# Patient Record
Sex: FEMALE | Race: BLACK OR AFRICAN AMERICAN | URBAN - NONMETROPOLITAN AREA
[De-identification: names, ages, dates, MRNs, and addresses within clinical notes are randomized per-mention and may not be internally consistent; named-entity substitution may affect disease eponyms.]

---

## 2023-02-08 ENCOUNTER — TELEPHONE ENCOUNTER (OUTPATIENT)
Dept: URBAN - NONMETROPOLITAN AREA CLINIC 2 | Facility: CLINIC | Age: 48
End: 2023-02-08

## 2023-02-13 ENCOUNTER — OFFICE VISIT (OUTPATIENT)
Dept: URBAN - NONMETROPOLITAN AREA CLINIC 2 | Facility: CLINIC | Age: 48
End: 2023-02-13

## 2023-02-14 ENCOUNTER — LAB OUTSIDE AN ENCOUNTER (OUTPATIENT)
Dept: URBAN - NONMETROPOLITAN AREA CLINIC 2 | Facility: CLINIC | Age: 48
End: 2023-02-14

## 2023-02-14 ENCOUNTER — OFFICE VISIT (OUTPATIENT)
Dept: URBAN - NONMETROPOLITAN AREA CLINIC 2 | Facility: CLINIC | Age: 48
End: 2023-02-14
Payer: COMMERCIAL

## 2023-02-14 ENCOUNTER — WEB ENCOUNTER (OUTPATIENT)
Dept: URBAN - NONMETROPOLITAN AREA CLINIC 2 | Facility: CLINIC | Age: 48
End: 2023-02-14

## 2023-02-14 VITALS
HEART RATE: 96 BPM | WEIGHT: 129 LBS | BODY MASS INDEX: 20.25 KG/M2 | DIASTOLIC BLOOD PRESSURE: 80 MMHG | HEIGHT: 67 IN | TEMPERATURE: 98.5 F | SYSTOLIC BLOOD PRESSURE: 131 MMHG

## 2023-02-14 DIAGNOSIS — R63.0 LACK OF APPETITE: ICD-10-CM

## 2023-02-14 DIAGNOSIS — R63.4 WEIGHT LOSS: ICD-10-CM

## 2023-02-14 DIAGNOSIS — M06.9 RHEUMATOID ARTHRITIS, INVOLVING UNSPECIFIED SITE, UNSPECIFIED WHETHER RHEUMATOID FACTOR PRESENT: ICD-10-CM

## 2023-02-14 DIAGNOSIS — Z12.11 COLON CANCER SCREENING: ICD-10-CM

## 2023-02-14 PROBLEM — 69896004: Status: ACTIVE | Noted: 2023-02-14

## 2023-02-14 PROCEDURE — 99204 OFFICE O/P NEW MOD 45 MIN: CPT | Performed by: INTERNAL MEDICINE

## 2023-02-14 PROCEDURE — 99244 OFF/OP CNSLTJ NEW/EST MOD 40: CPT | Performed by: INTERNAL MEDICINE

## 2023-02-14 RX ORDER — GABAPENTIN 300 MG/1
1 CAPSULE CAPSULE ORAL ONCE A DAY
Status: ACTIVE | COMMUNITY

## 2023-02-14 RX ORDER — OMEPRAZOLE 20 MG/1
1 CAPSULE 30 MINUTES BEFORE MORNING MEAL CAPSULE, DELAYED RELEASE ORAL ONCE A DAY
Qty: 90 CAPSULES | Refills: 3 | OUTPATIENT
Start: 2023-02-14

## 2023-02-14 NOTE — HPI-TODAY'S VISIT:
Today/14/2023 Mrs. Cunha is a 47-year-old female who is referred to our clinic by Dr. Dylon Figueroa for consultation of elevated liver enzymes and weight loss.  A copy this note be sent to the referring physician.  She states over the past 2 years she has lost over 100 pounds.  She reports early satiety and lack of appetite.  This started after her initial COVID infection.  Her liver enzymes are slowly trending upward AST triple ALT with elevated alkaline phosphatase.  She drinks 3-4 angry orchard ciders 4 to 5 days out of the week.  She is on ibuprofen every other day for her rheumatoid arthritis along with Percocet 7.5.  On her CT imaging done with contrast in September 2022 she has significant moderate to severe steatosis.  Her synthetic function including her platelets and her bilirubin are normal.  She has not had any chronic serologies for her liver that she is aware of.  She is never had a colonoscopy.  She denies any reflux.  She reports nausea and early satiety.  Today we have discussed her differential.  I suspect that her nausea and early satiety along with weight loss may be due to narcotic plus or minus post viral gastroparesis.  We will start omeprazole 20 mg daily and schedule gastric emptying study.  Consider EGD at follow-up visit.  Her liver enzymes are elevated in an alcoholic pattern however she does have rheumatoid arthritis and is on NSAIDs along with narcotics.  We will check chronic serologies and repeat imaging with FibroScan.  I want her to stop all alcohol.  We will follow-up pending her work-up.  MB

## 2023-02-19 LAB
ABSOLUTE BASOPHILS: 49
ABSOLUTE EOSINOPHILS: 42
ABSOLUTE LYMPHOCYTES: 2975
ABSOLUTE MONOCYTES: 518
ABSOLUTE NEUTROPHILS: 3416
ACTIN (SMOOTH MUSCLE) ANTIBODY (IGG): <20
ALPHA-1-ANTITRYPSIN QN: 197
ANA SCREEN, IFA: NEGATIVE
BASOPHILS: 0.7
CERULOPLASMIN: 34
EOSINOPHILS: 0.6
FERRITIN, SERUM: 41
GGT: 199
HBSAG SCREEN: (no result)
HEMATOCRIT: 39.8
HEMOGLOBIN: 13
HEP A AB, IGM: (no result)
HEP B CORE AB, IGM: (no result)
HEP C VIRUS AB: 0.02
HEPATITIS C ANTIBODY: (no result)
IMMUNOGLOBULIN G, QN, SERUM: 2127
INR: 1
IRON BIND.CAP.(TIBC): 354
IRON SATURATION: 35
IRON: 123
LKM-1 ANTIBODY (IGG): <=20
LYMPHOCYTES: 42.5
MCH: 29.4
MCHC: 32.7
MCV: 90
MITOCHONDRIAL (M2) ANTIBODY: <=20
MONOCYTES: 7.4
MPV: 10.9
NEUTROPHILS: 48.8
PLATELET COUNT: 224
PT: 10.7
RDW: 13.6
RED BLOOD CELL COUNT: 4.42
TSH: 0.35
WHITE BLOOD CELL COUNT: 7

## 2023-02-22 ENCOUNTER — TELEPHONE ENCOUNTER (OUTPATIENT)
Dept: URBAN - METROPOLITAN AREA CLINIC 92 | Facility: CLINIC | Age: 48
End: 2023-02-22

## 2023-03-16 ENCOUNTER — TELEPHONE ENCOUNTER (OUTPATIENT)
Dept: URBAN - METROPOLITAN AREA CLINIC 35 | Facility: CLINIC | Age: 48
End: 2023-03-16

## 2023-03-16 LAB
A/G RATIO: 1.1
ALBUMIN: 3.9
ALKALINE PHOSPHATASE: 139
ALT (SGPT): 19
AST (SGOT): 52
BILIRUBIN, TOTAL: 0.4
BUN/CREATININE RATIO: (no result)
BUN: 7
CALCIUM: 9.2
CARBON DIOXIDE, TOTAL: 30
CHLORIDE: 103
CREATININE: 0.69
EGFR: 109
GLOBULIN, TOTAL: 3.5
GLUCOSE: 101
POTASSIUM: 4.4
PROTEIN, TOTAL: 7.4
SODIUM: 139

## 2023-03-27 NOTE — PHYSICAL EXAM SKIN:
no rashes , no suspicious lesions Valtrex Pregnancy And Lactation Text: this medication is Pregnancy Category B and is considered safe during pregnancy. This medication is not directly found in breast milk but it's metabolite acyclovir is present.

## 2023-04-10 ENCOUNTER — TELEPHONE ENCOUNTER (OUTPATIENT)
Dept: URBAN - METROPOLITAN AREA CLINIC 35 | Facility: CLINIC | Age: 48
End: 2023-04-10

## 2023-05-10 ENCOUNTER — TELEPHONE ENCOUNTER (OUTPATIENT)
Dept: URBAN - NONMETROPOLITAN AREA CLINIC 2 | Facility: CLINIC | Age: 48
End: 2023-05-10

## 2023-05-10 RX ORDER — POLYETHYLENE GLYCOL 3350, SODIUM SULFATE ANHYDROUS, SODIUM BICARBONATE, SODIUM CHLORIDE, POTASSIUM CHLORIDE 236; 22.74; 6.74; 5.86; 2.97 G/4L; G/4L; G/4L; G/4L; G/4L
3000 ML POWDER, FOR SOLUTION ORAL ONCE
Qty: 1 UNSPECIFIED | Refills: 0 | OUTPATIENT
Start: 2023-05-10 | End: 2023-05-11

## 2023-05-12 ENCOUNTER — OFFICE VISIT (OUTPATIENT)
Dept: URBAN - NONMETROPOLITAN AREA SURGERY CENTER 1 | Facility: SURGERY CENTER | Age: 48
End: 2023-05-12
Payer: COMMERCIAL

## 2023-05-12 DIAGNOSIS — Z12.11 COLON CANCER SCREENING: ICD-10-CM

## 2023-05-12 PROCEDURE — G8907 PT DOC NO EVENTS ON DISCHARG: HCPCS | Performed by: INTERNAL MEDICINE

## 2023-05-12 PROCEDURE — 45378 DIAGNOSTIC COLONOSCOPY: CPT | Performed by: INTERNAL MEDICINE

## 2023-08-15 ENCOUNTER — OFFICE VISIT (OUTPATIENT)
Dept: URBAN - NONMETROPOLITAN AREA CLINIC 2 | Facility: CLINIC | Age: 48
End: 2023-08-15
Payer: COMMERCIAL

## 2023-08-15 VITALS
DIASTOLIC BLOOD PRESSURE: 74 MMHG | HEIGHT: 67 IN | SYSTOLIC BLOOD PRESSURE: 151 MMHG | WEIGHT: 135.8 LBS | TEMPERATURE: 98.7 F | HEART RATE: 116 BPM | BODY MASS INDEX: 21.31 KG/M2

## 2023-08-15 DIAGNOSIS — M06.9 RHEUMATOID ARTHRITIS, INVOLVING UNSPECIFIED SITE, UNSPECIFIED WHETHER RHEUMATOID FACTOR PRESENT: ICD-10-CM

## 2023-08-15 DIAGNOSIS — R63.4 WEIGHT LOSS: ICD-10-CM

## 2023-08-15 DIAGNOSIS — R63.0 LACK OF APPETITE: ICD-10-CM

## 2023-08-15 DIAGNOSIS — Z12.11 COLON CANCER SCREENING: ICD-10-CM

## 2023-08-15 DIAGNOSIS — R79.89 LOW TSH LEVEL: ICD-10-CM

## 2023-08-15 DIAGNOSIS — R74.8 ELEVATED LIVER ENZYMES: ICD-10-CM

## 2023-08-15 PROBLEM — 131017004: Status: ACTIVE | Noted: 2023-02-22

## 2023-08-15 PROBLEM — 89362005: Status: ACTIVE | Noted: 2023-02-14

## 2023-08-15 PROBLEM — 79890006: Status: ACTIVE | Noted: 2023-02-14

## 2023-08-15 PROBLEM — 305058001: Status: ACTIVE | Noted: 2023-02-14

## 2023-08-15 PROBLEM — 707724006: Status: ACTIVE | Noted: 2023-02-14

## 2023-08-15 PROCEDURE — 99214 OFFICE O/P EST MOD 30 MIN: CPT | Performed by: NURSE PRACTITIONER

## 2023-08-15 RX ORDER — GABAPENTIN 600 MG/1
1 TABLET TABLET, FILM COATED ORAL ONCE A DAY
Status: ACTIVE | COMMUNITY

## 2023-08-15 RX ORDER — OMEPRAZOLE 20 MG/1
1 CAPSULE 30 MINUTES BEFORE MORNING MEAL CAPSULE, DELAYED RELEASE ORAL ONCE A DAY
Qty: 90 CAPSULES | Refills: 3 | Status: ACTIVE | COMMUNITY
Start: 2023-02-14

## 2023-08-15 RX ORDER — OMEPRAZOLE 20 MG/1
1 CAPSULE 30 MINUTES BEFORE MORNING MEAL CAPSULE, DELAYED RELEASE ORAL ONCE A DAY
Qty: 90 CAPSULES | Refills: 3
Start: 2023-02-14

## 2023-08-15 NOTE — HPI-TODAY'S VISIT:
Today/14/2023 Mrs. Cunha is a 47-year-old female who is referred to our clinic by Dr. Dylon Figueroa for consultation of elevated liver enzymes and weight loss.  A copy this note be sent to the referring physician.  She states over the past 2 years she has lost over 100 pounds.  She reports early satiety and lack of appetite.  This started after her initial COVID infection.  Her liver enzymes are slowly trending upward AST triple ALT with elevated alkaline phosphatase.  She drinks 3-4 angry orchard ciders 4 to 5 days out of the week.  She is on ibuprofen every other day for her rheumatoid arthritis along with Percocet 7.5.  On her CT imaging done with contrast in September 2022 she has significant moderate to severe steatosis.  Her synthetic function including her platelets and her bilirubin are normal.  She has not had any chronic serologies for her liver that she is aware of.  She is never had a colonoscopy.  She denies any reflux.  She reports nausea and early satiety.  Today we have discussed her differential.  I suspect that her nausea and early satiety along with weight loss may be due to narcotic plus or minus post viral gastroparesis.  We will start omeprazole 20 mg daily and schedule gastric emptying study.  Consider EGD at follow-up visit.  Her liver enzymes are elevated in an alcoholic pattern however she does have rheumatoid arthritis and is on NSAIDs along with narcotics.  We will check chronic serologies and repeat imaging with FibroScan.  I want her to stop all alcohol.  We will follow-up pending her work-up.  MB 8/15/2023 Janette presents for follow-up of elevated liver enzymes.  Since her last visit she is cut her drinking in half, she is having 1-2 angry orchard ciders 3 to 4 days a week.  She does take shots occasionally but has not done so in months.  She continues to take narcotics for her chronic pain and RA along with neuropathy but she has been transitioned over to methadone.  She has been off NSAIDs.  Her colonoscopy is normal.  Her gastric emptying study is normal however she does have 39% of the material at 59 minutes.  She is on omeprazole 20 mg daily.  Her appetite comes and goes.  She is being worked up at [place] for her neuropathy.  She is also following for disability.  Today she is due for repeat labs, we had a long discussion again regarding alcohol cessation.  Her TSH was very low, she still not seen endocrinology for this.  We will repeat her numbers today.  Want her to continue to work on alcohol cessation.  She has severe steatosis on CT imaging and FT4 on FibroScan.  Her synthetic function remains normal, we will repeat her labs today.  MB

## 2023-08-16 ENCOUNTER — TELEPHONE ENCOUNTER (OUTPATIENT)
Dept: URBAN - NONMETROPOLITAN AREA CLINIC 2 | Facility: CLINIC | Age: 48
End: 2023-08-16

## 2023-08-16 LAB
A/G RATIO: 1.3
ABSOLUTE BASOPHILS: 47
ABSOLUTE EOSINOPHILS: 103
ABSOLUTE LYMPHOCYTES: 3531
ABSOLUTE MONOCYTES: 545
ABSOLUTE NEUTROPHILS: 3674
ALBUMIN: 4.1
ALKALINE PHOSPHATASE: 144
ALT (SGPT): 14
AST (SGOT): 29
BASOPHILS: 0.6
BILIRUBIN, TOTAL: 0.3
BUN/CREATININE RATIO: 7
BUN: 6
CALCIUM: 8.9
CARBON DIOXIDE, TOTAL: 29
CHLORIDE: 105
CREATININE: 0.87
EGFR: 84
EOSINOPHILS: 1.3
GLOBULIN, TOTAL: 3.1
GLUCOSE: 81
HEMATOCRIT: 42.6
HEMOGLOBIN: 13.5
INR: 1
LYMPHOCYTES: 44.7
MCH: 31.4
MCHC: 31.7
MCV: 99.1
MONOCYTES: 6.9
MPV: 10.7
NEUTROPHILS: 46.5
PLATELET COUNT: 250
POTASSIUM: 3.7
PROTEIN, TOTAL: 7.2
PT: 10.9
RDW: 12
RED BLOOD CELL COUNT: 4.3
SODIUM: 143
TSH W/REFLEX TO FT4: 0.55
WHITE BLOOD CELL COUNT: 7.9

## 2023-11-15 ENCOUNTER — OFFICE VISIT (OUTPATIENT)
Dept: URBAN - METROPOLITAN AREA TELEHEALTH 2 | Facility: TELEHEALTH | Age: 48
End: 2023-11-15

## 2023-11-15 RX ORDER — GABAPENTIN 600 MG/1
1 TABLET TABLET, FILM COATED ORAL ONCE A DAY
Status: ACTIVE | COMMUNITY

## 2023-11-15 RX ORDER — OMEPRAZOLE 20 MG/1
1 CAPSULE 30 MINUTES BEFORE MORNING MEAL CAPSULE, DELAYED RELEASE ORAL ONCE A DAY
Qty: 90 CAPSULES | Refills: 3 | Status: ACTIVE | COMMUNITY
Start: 2023-02-14

## 2024-03-04 ENCOUNTER — LAB (OUTPATIENT)
Dept: URBAN - NONMETROPOLITAN AREA CLINIC 2 | Facility: CLINIC | Age: 49
End: 2024-03-04

## 2024-03-04 ENCOUNTER — OV EP (OUTPATIENT)
Dept: URBAN - NONMETROPOLITAN AREA CLINIC 2 | Facility: CLINIC | Age: 49
End: 2024-03-04
Payer: COMMERCIAL

## 2024-03-04 VITALS
HEART RATE: 82 BPM | SYSTOLIC BLOOD PRESSURE: 137 MMHG | TEMPERATURE: 98.8 F | HEIGHT: 67 IN | BODY MASS INDEX: 21.77 KG/M2 | DIASTOLIC BLOOD PRESSURE: 82 MMHG | WEIGHT: 138.7 LBS

## 2024-03-04 DIAGNOSIS — R63.4 WEIGHT LOSS: ICD-10-CM

## 2024-03-04 DIAGNOSIS — Z12.11 COLON CANCER SCREENING: ICD-10-CM

## 2024-03-04 DIAGNOSIS — M06.9 RHEUMATOID ARTHRITIS, INVOLVING UNSPECIFIED SITE, UNSPECIFIED WHETHER RHEUMATOID FACTOR PRESENT: ICD-10-CM

## 2024-03-04 DIAGNOSIS — R63.0 LACK OF APPETITE: ICD-10-CM

## 2024-03-04 DIAGNOSIS — R79.89 LOW TSH LEVEL: ICD-10-CM

## 2024-03-04 DIAGNOSIS — R74.8 ELEVATED LIVER ENZYMES: ICD-10-CM

## 2024-03-04 PROCEDURE — 99214 OFFICE O/P EST MOD 30 MIN: CPT | Performed by: NURSE PRACTITIONER

## 2024-03-04 RX ORDER — GABAPENTIN 600 MG/1
1 TABLET TABLET, FILM COATED ORAL ONCE A DAY
Status: ACTIVE | COMMUNITY

## 2024-03-04 RX ORDER — OMEPRAZOLE 20 MG/1
1 CAPSULE 30 MINUTES BEFORE MORNING MEAL CAPSULE, DELAYED RELEASE ORAL ONCE A DAY
Qty: 90 CAPSULES | Refills: 3 | Status: ACTIVE | COMMUNITY
Start: 2023-02-14

## 2024-03-04 NOTE — HPI-TODAY'S VISIT:
Today/14/2023 Mrs. Cunha is a 47-year-old female who is referred to our clinic by Dr. Dylon Figueroa for consultation of elevated liver enzymes and weight loss.  A copy this note be sent to the referring physician.  She states over the past 2 years she has lost over 100 pounds.  She reports early satiety and lack of appetite.  This started after her initial COVID infection.  Her liver enzymes are slowly trending upward AST triple ALT with elevated alkaline phosphatase.  She drinks 3-4 angry orchard ciders 4 to 5 days out of the week.  She is on ibuprofen every other day for her rheumatoid arthritis along with Percocet 7.5.  On her CT imaging done with contrast in September 2022 she has significant moderate to severe steatosis.  Her synthetic function including her platelets and her bilirubin are normal.  She has not had any chronic serologies for her liver that she is aware of.  She is never had a colonoscopy.  She denies any reflux.  She reports nausea and early satiety.  Today we have discussed her differential.  I suspect that her nausea and early satiety along with weight loss may be due to narcotic plus or minus post viral gastroparesis.  We will start omeprazole 20 mg daily and schedule gastric emptying study.  Consider EGD at follow-up visit.  Her liver enzymes are elevated in an alcoholic pattern however she does have rheumatoid arthritis and is on NSAIDs along with narcotics.  We will check chronic serologies and repeat imaging with FibroScan.  I want her to stop all alcohol.  We will follow-up pending her work-up.  MB 8/15/2023 Janette presents for follow-up of elevated liver enzymes.  Since her last visit she is cut her drinking in half, she is having 1-2 angry orchard ciders 3 to 4 days a week.  She does take shots occasionally but has not done so in months.  She continues to take narcotics for her chronic pain and RA along with neuropathy but she has been transitioned over to methadone.  She has been off NSAIDs.  Her colonoscopy is normal.  Her gastric emptying study is normal however she does have 39% of the material at 59 minutes.  She is on omeprazole 20 mg daily.  Her appetite comes and goes.  She is being worked up at [place] for her neuropathy.  She is also following for disability.  Today she is due for repeat labs, we had a long discussion again regarding alcohol cessation.  Her TSH was very low, she still not seen endocrinology for this.  We will repeat her numbers today.  Want her to continue to work on alcohol cessation.  She has severe steatosis on CT imaging and FT4 on FibroScan.  Her synthetic function remains normal, we will repeat her labs today.  MB 3/4/2024 Janette presents for follow-up of elevated liver enzymes.  Since her last visit she is almost completely stopped drinking.  She only has 1-2 ciders on a Saturday.  She is using marijuana for pain relief.  She is off Percocet and now on Suboxone only.  Her bowels are moving fairly regularly, she is up 4 more pounds.  She is able to eat more.  She is due for repeat liver enzymes, her FibroScan suggest F4 however her synthetic function is normal.  Today we will repeat her labs and ultrasound.  She will continue to try to avoid alcohol.  We will follow-up pending her results.  MB

## 2024-03-05 LAB
A/G RATIO: 1.4
ABSOLUTE BASOPHILS: 30
ABSOLUTE EOSINOPHILS: 112
ABSOLUTE LYMPHOCYTES: 2502
ABSOLUTE MONOCYTES: 437
ABSOLUTE NEUTROPHILS: 2820
ALBUMIN: 4
ALKALINE PHOSPHATASE: 182
ALT (SGPT): 22
AST (SGOT): 29
BASOPHILS: 0.5
BILIRUBIN, TOTAL: 0.2
BUN/CREATININE RATIO: (no result)
BUN: 8
CALCIUM: 9.2
CARBON DIOXIDE, TOTAL: 26
CHLORIDE: 105
CREATININE: 0.56
EGFR: 114
EOSINOPHILS: 1.9
GLOBULIN, TOTAL: 2.9
GLUCOSE: 82
HEMATOCRIT: 38.4
HEMOGLOBIN: 12.8
INR: 1
LYMPHOCYTES: 42.4
MCH: 31.9
MCHC: 33.3
MCV: 95.8
MONOCYTES: 7.4
MPV: 10.2
NEUTROPHILS: 47.8
PLATELET COUNT: 241
POTASSIUM: 3.9
PROTEIN, TOTAL: 6.9
PT: 10.7
RDW: 13.8
RED BLOOD CELL COUNT: 4.01
SODIUM: 144
WHITE BLOOD CELL COUNT: 5.9

## 2024-08-05 ENCOUNTER — OFFICE VISIT (OUTPATIENT)
Dept: URBAN - NONMETROPOLITAN AREA CLINIC 2 | Facility: CLINIC | Age: 49
End: 2024-08-05

## 2024-10-15 ENCOUNTER — OFFICE VISIT (OUTPATIENT)
Dept: URBAN - NONMETROPOLITAN AREA CLINIC 2 | Facility: CLINIC | Age: 49
End: 2024-10-15